# Patient Record
(demographics unavailable — no encounter records)

---

## 2024-11-14 NOTE — CONSULT LETTER
[Dear  ___] : Dear ~DELIA, [Consult Letter:] : I had the pleasure of evaluating your patient, [unfilled]. [( Thank you for referring [unfilled] for consultation for _____ )] : Thank you for referring [unfilled] for consultation for [unfilled] [Please see my note below.] : Please see my note below. [Consult Closing:] : Thank you very much for allowing me to participate in the care of this patient.  If you have any questions, please do not hesitate to contact me. [Sincerely,] : Sincerely, [FreeTextEntry2] : Li Antoine MD  [address], [address] [place] [address]  [FreeTextEntry3] : Jonathan Morales MD, FACOG, FACS Minimally Invasive Gynecologic Surgery [address] [address] Tel: [phone] Fax: [phone]

## 2024-11-14 NOTE — DISCUSSION/SUMMARY
[FreeTextEntry1] : I sat down with the patient to discuss the imaging findings & her symptoms which warrant surgical intervention. We reviewed that there is no proven medical benefit to keeping the cervix, and removal in the future is more difficult. Abril has a history of abnormal pap smear in the past. I recommend definitive hysterectomy with bilateral salpingo-oophorectomy menopause was in 2018 via a minimally invasive approach.     The options for surgical approach including open, vaginal, and laparoscopic with or without robotic assistance were discussed and the patient agrees with plan for laparoscopic hysterectomy with bilateral salpingectomy.  The differential diagnosis was discussed in detail. The indications, risks, benefits and alternatives were discussed. Including but not limited to conversion to laparotomy, bleeding, infection, injury to surrounding organs was discussed at length.  She understand that there is increased risk for bladder injury with prior  section. Chance of occult injury and need for future surgery. ABRIL ESPINAL expressed an understanding of the treatment rationale and her questions were answered to her apparent satisfaction.  She was given written information about postoperative care and diagrams of the pelvic anatomy. Due to concern about prolapse will do uterosacral suspension at the time of hysterectomy she understand it slightly increases the risk of injury to the ureters either at the time or surgery or delayed.

## 2024-11-14 NOTE — HISTORY OF PRESENT ILLNESS
[Patient reported mammogram was normal] : Patient reported mammogram was normal [Patient reported breast sonogram was normal] : Patient reported breast sonogram was normal [Patient reported PAP Smear was normal] : Patient reported PAP Smear was normal [Patient reported bone density results were normal] : Patient reported bone density results were normal [Patient reported colonoscopy was normal] : Patient reported colonoscopy was normal [LMP unknown] : LMP unknown [postmenopausal] : postmenopausal [N] : Patient does not use contraception [Y] : Positive pregnancy history [Hot Flashes] : hot flashes [Night Sweats] : night sweats [Difficulty with Quesada] : difficulty with intercourse [Menarche Age: ____] : age at menarche was [unfilled] [Currently In Menopause] : currently in menopause [Experiencing Menopausal Sxs] : experiencing menopausal symptoms [Menopause Age: ____] : age at menopause was [unfilled] [Yes] : Patient has concerns regarding sex [Currently Active] : currently active [Men] : men [No] : No [Patient refuses STI testing] : Patient refuses STI testing [Mammogramdate] : 09/28/24 [BreastSonogramDate] : 09/28/24 [PapSmeardate] : 05/2024 [BoneDensityDate] : 07/2024 [ColonoscopyDate] : 05/2021 [PGHxTotal] : 5 [[facility] [PGHxAbortions] : 1 [[facility]] : 2 [PGHxABSpont] : 1 [PGxEctopic] : 1 [FreeTextEntry1] : Vaginal dryness

## 2025-01-15 NOTE — HISTORY OF PRESENT ILLNESS
[FreeTextEntry1] : This visit was conducted with the use of interactive audio and video telecommunication system that permits real time communication between the provider and the patient. Verbal consent for the visit obtained on 01/14/2025  Originating site: Provider office, [address] [place] [address] Distant Site: Patient's Home, [address] [place] [address]   56 yo presents for follow up prior to scheduled hysterectomy on 1/24/25

## 2025-01-15 NOTE — DISCUSSION/SUMMARY
[FreeTextEntry1] : BECKI ESPINAL 55 year old presents for follow-up prior to surgery. The associated risks, benefits, alternatives of the procedure have been outlined discussed and reviewed with the patient. These risks including but not limited to bleeding, infection, injury to adjacent organs, need for secondary surgery, incisional dehiscence, incisional hernia, change in bowel habits, change in urinary function, nerve injury, change in sexual function/impaired fertility, as well as the risk of heart and lung complications, stroke, DVT/PE and death were detailed.   The possibility of recurrent or continued symptoms, requiring continued medical management despite surgical intervention, was discussed.  We reviewed pain management, diet, enhanced recovery protocol. Medication prescribed.   Effort for the visit includes the note prep, review of prior material, interview, exam, further documentation, and coordination of care.

## 2025-02-06 NOTE — HISTORY OF PRESENT ILLNESS
[FreeTextEntry1] : 55 year old presents for postoperative visit s/p TOTAL LAPAROSCOPIC HYSTERECTOMY BILATERAL SALPINGECTOMY AND OOPHERECTOMY. She reports regular voiding and bowel movements, denies fevers/chills/dysuria.  Not taking pain medication.

## 2025-02-06 NOTE — DISCUSSION/SUMMARY
[FreeTextEntry1] : 55 year old s/p TOTAL LAPAROSCOPIC HYSTERECTOMY BILATERAL SALPINGECTOMY AND OOPHERECTOMY presents for postoperative visit doing well  -given copy of pathology and operative report is pending  -continue pelvic rest with modified activity  -follow up in 4 weeks

## 2025-03-06 NOTE — HISTORY OF PRESENT ILLNESS
[FreeTextEntry1] : 55 year old presents for postoperative visit s/p TOTAL LAPAROSCOPIC HYSTERECTOMY BILATERAL SALPINGECTOMY AND OOPHERECTOMY on 02/06/25. She reports regular voiding and bowel movements, denies fevers/chills/dysuria.  Not taking pain medication.

## 2025-03-06 NOTE — DISCUSSION/SUMMARY
[FreeTextEntry1] : 55 year old s/p TOTAL LAPAROSCOPIC HYSTERECTOMY BILATERAL SALPINGECTOMY AND OOPHERECTOMY presents for postoperative visit doing well  -continue pelvic rest with modified activity  -resume all activity without limitation  -resume routine GYN care  -follow up in  4 months for ultrasound